# Patient Record
(demographics unavailable — no encounter records)

---

## 2024-10-09 NOTE — PHYSICAL EXAM
[FreeTextEntry1] : alert and oriented x 3, speech fluent, names easily, follows requests, good recall for recent and remote events. EOM full without sustained nystagmus, PERRL, face symmetrical, no dysarthria Motor - full strength in all extremities. normal rapid-alternating movements. Sensory - intact LT bilaterally Coord - no tremor, ataxia Gait - stands without difficulty, normal gait.

## 2024-10-09 NOTE — HISTORY OF PRESENT ILLNESS
[FreeTextEntry1] : goes by "Nick"  *** 10/09/2024  *** Nick reports no interval problems - at last visit found to have hyponatremia - successfully tapered off oxcarbazepine (no h/o seizures) and f/u labs were normal.  Also taking clomipramine from psychiatrist - mother feels that behavior has been good.  clomipramine levels were subtherapeutic on recent labs.  MRI head in June and rEEG in June were both unremarkable. Mother has noted no change in behavior since dc'ing oxcarbazepine.   *** 06/14/2024  *** Nick is a 23yo LH autistic adult referred by his psychiatrist and parent for evaluation to r/o seizures as etiology of explosive aggressive behaviors.  Nick has not had prior convulsive seizures.  On some occasions he has become aggressive - hitting siblings or care givers - in response to frustrating event. However, at other time, mother reports that out of the blue he becomes aggressive and later may have little recall for the episode. Review of chart identifies approximately yearly ED visit for aggressive behavior.  In addition, Nick has episodes - "blackouts" - that vision goes black for several minutes.  It can happen more than once a day. Has not injured himself or bumped into things, he is able to take glass of water when this happens.  Nick also c/o headaches that occur "once in a while" - do not last long and go away without intervention.  Has been taking oxcarbazepine since about 2 years. Mother is unsure whether there is any difference since taking medication.  PRITESH has explosive episodes - several ED visits over last few years - and time between ED visits has stretched.   PMH - none Soc Hx - no toxic habits FH - non-contributory ROS - OCD behaviors associated with autism, otherwise negative x 14 systems

## 2024-10-09 NOTE — ASSESSMENT
[FreeTextEntry1] : Jack is referred by psychiatrist to determine whether there is neurological component to his abrupt changes in behavior - possibly ictal or post-ictal behaviors. History is not suggestive of epilepsy, but epilepsy is common among adults with autism (up to 25%) and brief seizures might be missed and only the post-ictal agitation is noticed. Exam mostly unremarkable other than OCD behaviors and slower TUNDE than expected - slower in the dominant L hand than the non-dominant hand - no clear significance, MRI unremarkable. rEEG unremarkable. Oxcarbazepine DC'd for hyponatremia and lack of benefit to behavior (no evidence of seizures) Plan 1. f/u as needed

## 2024-11-04 NOTE — ASSESSMENT
[FreeTextEntry1] : EKG 6/24/2024- Sinus Rhythm  -Incomplete right bundle branch block. ABNORMAL Holter monitor July 8, 2024: Normal sinus rhythm, rare PACs and PVCs.  Episodes of tachycardia up to 170 noted.  Echocardiogram July 8: 2024: LVEF 50 to 55%.  Normal RV size and function.  No pericardial effusion.  Exercise stress test August 7, 2024: Negative stress test.  Assessment: 1.  Intermittent palpitations- has ILR for monitoring 2.  Near syncope 3.  Dizziness 4.  Orthostatic hypotension 5.  Abnormal EKG-sinus rhythm with a incomplete right bundle branch block.  Normal QTc.  Recommendations: I have discussed all the test results with the patient and his mother.  1.    Follow-up in 6 months

## 2024-11-04 NOTE — HISTORY OF PRESENT ILLNESS
[FreeTextEntry1] : HPI:  Patient is a 24-year-old  male with autism spectrum disorder, anxiety presents for cardiac evaluation.  Patient started having symptoms of palpitations towards the end of May 2024, has had multiple episodes of near syncope, patient states he blacks out does not fall to the ground or become unconscious.  Patient states he feels palpitations when he is anxious.  Since last office visit patient seen by EP has an ILR implanted for monitoring arrhythmias.  Patient had stress test and echocardiogram which were unremarkable. Patient is accompanied by his mother.  Reports no new complaints.  (Patient's grandfather is a retired gastroenterologist who has been checking his vital signs patient noted to have a drop in the systolic blood pressure by 20 points upon standing from sitting position.)     PMH: No known medical problems  Social History: Non-smoker denies any alcohol or substance abuse.  Family history:Noncontributory.   ROS - OCD behaviors associated with autism, otherwise negative x 14 systems Patient's mother  in the examination room.

## 2024-11-24 NOTE — HISTORY OF PRESENT ILLNESS
[FreeTextEntry1] :  INCOMPLETE NOTE . 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 Patient presents in arrhythmia management and follow up post ILR implantation. TTE and stress test - WNL   **EKG -

## 2024-11-24 NOTE — DISCUSSION/SUMMARY
[FreeTextEntry1] : In summary, Mr. 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    ** ILR shows __ heart rate trends with-- pause or tomás alerts, and **----AT/AF episodes, battery -

## 2024-11-24 NOTE — CARDIOLOGY SUMMARY
[de-identified] : EC24: incomplete RBBB vs Brugada type III pattern [de-identified] : Avg HR 94bpm, Min HR 51 bpm, Max  b, SVE and PVC burden < 0.01 %, Ventricular arrhythmias 1 event 3 beats with the fastest 154 bpm.   [de-identified] : 7/17/24:  1. RBBB. Left ventricular systolic function is normal with an ejection fraction of 59 % by Huitron's method of disks with an ejection fraction visually estimated at 50 to 55 %. 2. Normal left ventricular diastolic function. 3. Normal right ventricular cavity size and normal right ventricular systolic function. 4. No pericardial effusion seen. 5. No prior echocardiogram is available for comparison. [de-identified] : 24 :  1. The ECG is negative for ischemia. 2. The patient underwent stress testing using the standard Hugo protocol. _ The patient exercised for 6 min 46 sec. _ The test was stopped due to completion of protocol. _ The peak heart rate was 169 bpm; 86 % of predicted maximal heart rate for this patient. _ The patient achieved 9.3 METS which is consistent with fair exercise capacity considering age and other clinical characteristics. 3. Baseline electrocardiogram: sinus tachycardia at a rate of 115 bpm with non-specific T wave abnormality. 4. Stress electrocardiogram: No significant ischemic ST segment changes. Stress electrocardiogram: No ischemic ST segment changes. 5. Stress EC.0 mm horizontal ST segment depression in leads III and AVF starting at 0250 minutes during stress at 141 bpm and persisting 05:50 minutes into stress.

## 2024-12-05 NOTE — HISTORY OF PRESENT ILLNESS
[FreeTextEntry1] : Mr. 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 Patient presents in arrhythmia management and follow up post ILR implantation. TTE and stress test - WNL  Denies chest pain, shortness of breath, dizziness, lightheadedness, palpitations or near syncope or syncope, orthopnea, PND and increasing lower extremity edema.  12 lead ECG- Sinus Tachycardia 128 bpm with normal axis and intervals, normal QRS, no ST-T changes. No evidence of LVH.   ILR shows stable heart rate trends 80- 120s with no pause or tomás alerts, and no AT/AF episodes since the last follow-up.

## 2024-12-05 NOTE — CARDIOLOGY SUMMARY
[de-identified] : EC24: incomplete RBBB vs Brugada type III pattern [de-identified] : Avg HR 94bpm, Min HR 51 bpm, Max  b, SVE and PVC burden < 0.01 %, Ventricular arrhythmias 1 event 3 beats with the fastest 154 bpm.   [de-identified] : 24 :  1. The ECG is negative for ischemia. 2. The patient underwent stress testing using the standard Hugo protocol. _ The patient exercised for 6 min 46 sec. _ The test was stopped due to completion of protocol. _ The peak heart rate was 169 bpm; 86 % of predicted maximal heart rate for this patient. _ The patient achieved 9.3 METS which is consistent with fair exercise capacity considering age and other clinical characteristics. 3. Baseline electrocardiogram: sinus tachycardia at a rate of 115 bpm with non-specific T wave abnormality. 4. Stress electrocardiogram: No significant ischemic ST segment changes. Stress electrocardiogram: No ischemic ST segment changes. 5. Stress EC.0 mm horizontal ST segment depression in leads III and AVF starting at 0250 minutes during stress at 141 bpm and persisting 05:50 minutes into stress. [de-identified] : 7/17/24:  1. RBBB. Left ventricular systolic function is normal with an ejection fraction of 59 % by Huitron's method of disks with an ejection fraction visually estimated at 50 to 55 %. 2. Normal left ventricular diastolic function. 3. Normal right ventricular cavity size and normal right ventricular systolic function. 4. No pericardial effusion seen. 5. No prior echocardiogram is available for comparison.

## 2024-12-05 NOTE — DISCUSSION/SUMMARY
[FreeTextEntry1] : In summary, Mr. 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      ILR shows stable heart rate trends 80- 120s with no pause or tomás alerts, and no AT/AF episodes since the last follow-up.  Recommendations:  # Monthly remote ILR interrogations Advised to call with any concerns In office follow up in 1 yr  [EKG obtained to assist in diagnosis and management of assessed problem(s)] : EKG obtained to assist in diagnosis and management of assessed problem(s)

## 2024-12-05 NOTE — CARDIOLOGY SUMMARY
[de-identified] : EC24: incomplete RBBB vs Brugada type III pattern [de-identified] : Avg HR 94bpm, Min HR 51 bpm, Max  b, SVE and PVC burden < 0.01 %, Ventricular arrhythmias 1 event 3 beats with the fastest 154 bpm.   [de-identified] : 24 :  1. The ECG is negative for ischemia. 2. The patient underwent stress testing using the standard Hugo protocol. _ The patient exercised for 6 min 46 sec. _ The test was stopped due to completion of protocol. _ The peak heart rate was 169 bpm; 86 % of predicted maximal heart rate for this patient. _ The patient achieved 9.3 METS which is consistent with fair exercise capacity considering age and other clinical characteristics. 3. Baseline electrocardiogram: sinus tachycardia at a rate of 115 bpm with non-specific T wave abnormality. 4. Stress electrocardiogram: No significant ischemic ST segment changes. Stress electrocardiogram: No ischemic ST segment changes. 5. Stress EC.0 mm horizontal ST segment depression in leads III and AVF starting at 0250 minutes during stress at 141 bpm and persisting 05:50 minutes into stress. [de-identified] : 7/17/24:  1. RBBB. Left ventricular systolic function is normal with an ejection fraction of 59 % by Huitron's method of disks with an ejection fraction visually estimated at 50 to 55 %. 2. Normal left ventricular diastolic function. 3. Normal right ventricular cavity size and normal right ventricular systolic function. 4. No pericardial effusion seen. 5. No prior echocardiogram is available for comparison.

## 2025-05-12 NOTE — ASSESSMENT
[FreeTextEntry1] : EKG 6/24/2024- Sinus Rhythm  -Incomplete right bundle branch block. ABNORMAL Holter monitor July 8, 2024: Normal sinus rhythm, rare PACs and PVCs.  Episodes of tachycardia up to 170 noted.  Echocardiogram July 8: 2024: LVEF 50 to 55%.  Normal RV size and function.  No pericardial effusion.  Exercise stress test August 7, 2024: Negative stress test.  EKG 5/12/2025- Sinus Rhythm  -Incomplete right bundle branch block. -Left atrial enlargement.  Assessment: 1.  Intermittent palpitations- has ILR for monitoring 2.  Near syncope 3.  Dizziness 4.  Orthostatic hypotension 5.  Abnormal EKG-sinus rhythm with a incomplete right bundle branch block.  Normal QTc.  Recommendations:  1.    Follow-up in 6 months 2. Patient has f/u in 5/2025 with EP

## 2025-05-12 NOTE — HISTORY OF PRESENT ILLNESS
[FreeTextEntry1] : HPI:  Patient is a 24-year-old  male with autism spectrum disorder, anxiety presents for cardiac evaluation.  Patient started having symptoms of palpitations towards the end of May 2024, has had multiple episodes of near syncope, patient states he blacks out does not fall to the ground or become unconscious.  Patient states he feels palpitations when he is anxious.  Patient seen by EP has an ILR implanted for monitoring arrhythmias.  Patient had stress test and echocardiogram which were unremarkable. with a RBBB vs type 3 Brugada underwent a Procanimide challenge which was Non- inducible for Brugada pattern and underwent MDT loop recorder to risk stratify syncopal episodes.   Today, May 12, 2025 patient presents for a follow-up visit.  No new complaints.  No change in medical history. Patient is accompanied by his mother.  Reports no new complaints.  (Patient's grandfather is a retired gastroenterologist who has been checking his vital signs patient noted to have a drop in the systolic blood pressure by 20 points upon standing from sitting position.)     PMH: No known medical problems  Social History: Non-smoker denies any alcohol or substance abuse.  Family history:Noncontributory.   ROS - OCD behaviors associated with autism, otherwise negative x 14 systems Patient's mother  in the examination room.